# Patient Record
(demographics unavailable — no encounter records)

---

## 2025-01-10 NOTE — PHYSICAL EXAM
[FreeTextEntry1] : General: Constitutional: Sitting comfortably in NAD. Psychiatric: well-groomed, appropriate affect Ears, Nose, Throat: no abnormalities, mucus membranes moist Neck: supple Extremities: no edema, clubbing or cyanosis Skin: no rash or neuro-cutaneous signs  Cognitive: Orientation, language, memory and knowledge screens intact.  Cranial Nerves: II: MARIE. III/IV/VI: EOM Full. VII: Face appears symmetric. VIII: Normal to screening. IX/X: Normal phonation. XI: Trapezius Symmetric. XII: Tongue midline.  Motor: Power: No pronator drift.  Narrow based gait, leans to one side or the other when ambulating  No ataxia. Romberg negative Unable to stand on either leg independently for more than a second without tipping or needing to hold on to something.

## 2025-01-10 NOTE — DISCUSSION/SUMMARY
[FreeTextEntry1] : Impression: 1) Gait was narrow based with no obvious ataxia, however pes planus noted and unable to stand well on either leg independently. Right knee surgery 2 years ago, and left knee causing a lot of pain. Suspect imbalance issues could be multifactorial - with knee pain and pes planus. Neuropathy labs normal 2) Memory/cognition: MOCA 25/30 with deficits in fluency, recall and language. 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 rEEG normal. Discussed with Dr. D'Amico, lesions unlikely to be causing memory / gait issues  Plan: 1) Repeat MRI brain w/wo to monitor L temporal lesion

## 2025-01-10 NOTE — END OF VISIT
[FreeTextEntry3] : I, Chyna Burciaga, attest that this documentation has been prepared under the direction in and the presence of provider Chapo Cox MD.

## 2025-01-10 NOTE — HISTORY OF PRESENT ILLNESS
[FreeTextEntry1] : 1/10/25 HPI: Corrie is a 66 year old female presenting for a follow up visit for balance and memory issues.   Feels things are exactly the same. Tried a few sessions of PT, but no improvement in gait/balance so she stopped going. Not falls, but still feels wobbly on her feet. Veers to one side or another hen walking.   ---------------------------- Last seen 8/26/24: Initial MRI brain w/o contrast showed small cystic lesion in the L temporal lobe associated with mild vasogenic edema. Post-contrast scan defined lesion as 1.6cm without enhancement. 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 rEEG normal.  Did not start PT for balance/gait instability. -------------------------- Last seen 6/14/24: She reports has issues have been going on for several years, at least 10 years. She describes her balance issues as inability to walk straight. Overtime, she has started bumping into things. When she walks down the egan at school, she feels like people think she is drunk. Her PCP attributed her symptoms to polypharmacy; however, patient wants to be reassured. Her symptoms have been getting worst overtime. She has not noticed any triggers or exacerbating factors that makes her balance worst.  She denies any falls, partly because she usually has something to hold on to, denies any HA, no change in vision, no reported family history. Denies any rigidity in her extremities. Denies any neck or back pain. No numbness or tingling in her lower extremities. She does admit to intermittent low BP in the mornings, admits to having some short-term memory issues. She forgets names, things that have happen. Never gets lost or leave stove on. Remembers things for long time ago.

## 2025-05-13 NOTE — HISTORY OF PRESENT ILLNESS
[de-identified] : Patient is a 67-year-old female here for reevaluation left knee osteoarthritis was last given cortisone injection January 7, 2025.  Patient states that she has had major relief from the injection currently in no pain denies numbness tingling denies instability   Left knee exam: Slight valgus alignment no effusion no ecchymosis no erythema tender to palpation the joint line range of motion 0 degrees to 115 degrees with patella crepitus no gross instability neurovascular intact calf soft nontender negative Homans  Reviewed prior x-rays bilateral knees from chart showing right knee surgical hardware intact position left knee moderate to advanced osteoarthritis  Discussed with patient in detail at this point she is doing well advised continuation of conservative treatment including Tylenol as needed for pain activities as tolerated will follow-up as needed.

## 2025-07-22 NOTE — HISTORY OF PRESENT ILLNESS
[de-identified] : Patient is a 67-year-old female here for reevaluation of left knee.  Patient has history of left knee osteoarthritis.  Patient states for the past month or 2 pain has been coming back.  Patient states a couple weeks ago she did have a fall to the left knee overall has been improving.  Denies numbness or tingling denies instability  Left knee exam: No effusion no ecchymosis no erythema joint line tenderness range of motion 0 degrees to 115 degrees patella crepitus no gross instability neurovascular intact calf soft nontender  X-ray left knee 3 views: No acute fractures, subluxations, dislocations.  Advanced osteoarthritis left knee  Discussed with patient detail she is having flareup of osteoarthritis left knee.  Discussed treatment options in detail plans for cortisone junction left knee  Dexamethasone and Lidocaine      Anesthesia: ethyl chloride sprayed topically.    Dexamethasone 10mg/mL 1 cc  Xylocaine 1% 20mg          Medication was injected in the LT knee after verbal consent using sterile preparation and technique. The risks, benefits, and alternatives to cortisone injection were explained in full to the patient. Risks outlined include but are not limited to infection, sepsis, bleeding, scarring, skin discoloration, temporary increase in pain, syncopal episode, failure to resolve symptoms, allergic reaction, symptom recurrence, and elevation of blood sugar in diabetics. Patient understood the risks. All questions were answered. After discussion of options, the patient requested an injection. Oral informed consent was obtained and sterile prep was done of the injection site. Sterile technique was utilized for the procedure including the preparation of the solutions used for the injection. Patient tolerated the procedure well. Advised to ice the injection site this evening.